# Patient Record
Sex: MALE | Race: WHITE | NOT HISPANIC OR LATINO | Employment: OTHER | URBAN - METROPOLITAN AREA
[De-identification: names, ages, dates, MRNs, and addresses within clinical notes are randomized per-mention and may not be internally consistent; named-entity substitution may affect disease eponyms.]

---

## 2022-02-23 ENCOUNTER — OFFICE VISIT (OUTPATIENT)
Dept: FAMILY MEDICINE | Facility: CLINIC | Age: 67
End: 2022-02-23
Payer: COMMERCIAL

## 2022-02-23 VITALS
BODY MASS INDEX: 28.71 KG/M2 | SYSTOLIC BLOOD PRESSURE: 159 MMHG | HEART RATE: 72 BPM | DIASTOLIC BLOOD PRESSURE: 96 MMHG | WEIGHT: 212 LBS | HEIGHT: 72 IN | TEMPERATURE: 97.8 F | OXYGEN SATURATION: 97 % | RESPIRATION RATE: 16 BRPM

## 2022-02-23 DIAGNOSIS — Z11.59 NEED FOR HEPATITIS C SCREENING TEST: ICD-10-CM

## 2022-02-23 DIAGNOSIS — Z13.220 SCREENING FOR HYPERLIPIDEMIA: ICD-10-CM

## 2022-02-23 DIAGNOSIS — M12.9 ARTHROPATHY: ICD-10-CM

## 2022-02-23 DIAGNOSIS — Z00.00 ENCOUNTER FOR PREVENTIVE HEALTH EXAMINATION: Primary | ICD-10-CM

## 2022-02-23 DIAGNOSIS — N40.1 BENIGN PROSTATIC HYPERPLASIA WITH NOCTURIA: ICD-10-CM

## 2022-02-23 DIAGNOSIS — R35.1 BENIGN PROSTATIC HYPERPLASIA WITH NOCTURIA: ICD-10-CM

## 2022-02-23 DIAGNOSIS — Z00.00 ENCOUNTER FOR MEDICARE ANNUAL WELLNESS EXAM: ICD-10-CM

## 2022-02-23 DIAGNOSIS — Z12.5 ENCOUNTER FOR SCREENING FOR MALIGNANT NEOPLASM OF PROSTATE: ICD-10-CM

## 2022-02-23 LAB
ALBUMIN SERPL-MCNC: 3.9 G/DL (ref 3.4–5)
ALP SERPL-CCNC: 55 U/L (ref 40–150)
ALT SERPL W P-5'-P-CCNC: 33 U/L (ref 0–70)
ANION GAP SERPL CALCULATED.3IONS-SCNC: 9 MMOL/L (ref 3–14)
AST SERPL W P-5'-P-CCNC: 20 U/L (ref 0–45)
BASOPHILS # BLD AUTO: 0 10E3/UL (ref 0–0.2)
BASOPHILS NFR BLD AUTO: 0 %
BILIRUB SERPL-MCNC: 0.5 MG/DL (ref 0.2–1.3)
BUN SERPL-MCNC: 21 MG/DL (ref 7–30)
CALCIUM SERPL-MCNC: 9.5 MG/DL (ref 8.5–10.1)
CHLORIDE BLD-SCNC: 106 MMOL/L (ref 94–109)
CHOLEST SERPL-MCNC: 193 MG/DL
CO2 SERPL-SCNC: 23 MMOL/L (ref 20–32)
CREAT SERPL-MCNC: 1.08 MG/DL (ref 0.66–1.25)
EOSINOPHIL # BLD AUTO: 0.1 10E3/UL (ref 0–0.7)
EOSINOPHIL NFR BLD AUTO: 2 %
ERYTHROCYTE [DISTWIDTH] IN BLOOD BY AUTOMATED COUNT: 13.9 % (ref 10–15)
FASTING STATUS PATIENT QL REPORTED: YES
FOLATE SERPL-MCNC: 20 NG/ML
GFR SERPL CREATININE-BSD FRML MDRD: 76 ML/MIN/1.73M2
GLUCOSE BLD-MCNC: 103 MG/DL (ref 70–99)
HCT VFR BLD AUTO: 48 % (ref 40–53)
HCV AB SERPL QL IA: NONREACTIVE
HDLC SERPL-MCNC: 52 MG/DL
HGB BLD-MCNC: 15.9 G/DL (ref 13.3–17.7)
LDLC SERPL CALC-MCNC: 124 MG/DL
LYMPHOCYTES # BLD AUTO: 1.4 10E3/UL (ref 0.8–5.3)
LYMPHOCYTES NFR BLD AUTO: 18 %
MCH RBC QN AUTO: 29.6 PG (ref 26.5–33)
MCHC RBC AUTO-ENTMCNC: 33.1 G/DL (ref 31.5–36.5)
MCV RBC AUTO: 89 FL (ref 78–100)
MONOCYTES # BLD AUTO: 0.5 10E3/UL (ref 0–1.3)
MONOCYTES NFR BLD AUTO: 7 %
NEUTROPHILS # BLD AUTO: 5.7 10E3/UL (ref 1.6–8.3)
NEUTROPHILS NFR BLD AUTO: 73 %
NONHDLC SERPL-MCNC: 141 MG/DL
PLATELET # BLD AUTO: 290 10E3/UL (ref 150–450)
POTASSIUM BLD-SCNC: 4.4 MMOL/L (ref 3.4–5.3)
PROT SERPL-MCNC: 7.7 G/DL (ref 6.8–8.8)
PSA SERPL-MCNC: 3.4 UG/L (ref 0–4)
RBC # BLD AUTO: 5.37 10E6/UL (ref 4.4–5.9)
SODIUM SERPL-SCNC: 138 MMOL/L (ref 133–144)
TRIGL SERPL-MCNC: 84 MG/DL
TSH SERPL DL<=0.005 MIU/L-ACNC: 2.2 MU/L (ref 0.4–4)
URATE SERPL-MCNC: 6.5 MG/DL (ref 3.5–7.2)
VIT B12 SERPL-MCNC: 499 PG/ML (ref 193–986)
WBC # BLD AUTO: 7.8 10E3/UL (ref 4–11)

## 2022-02-23 PROCEDURE — 86803 HEPATITIS C AB TEST: CPT | Performed by: INTERNAL MEDICINE

## 2022-02-23 PROCEDURE — 82607 VITAMIN B-12: CPT | Performed by: INTERNAL MEDICINE

## 2022-02-23 PROCEDURE — 84550 ASSAY OF BLOOD/URIC ACID: CPT | Performed by: INTERNAL MEDICINE

## 2022-02-23 PROCEDURE — 80053 COMPREHEN METABOLIC PANEL: CPT | Performed by: INTERNAL MEDICINE

## 2022-02-23 PROCEDURE — G0438 PPPS, INITIAL VISIT: HCPCS | Performed by: INTERNAL MEDICINE

## 2022-02-23 PROCEDURE — G0103 PSA SCREENING: HCPCS | Performed by: INTERNAL MEDICINE

## 2022-02-23 PROCEDURE — 36415 COLL VENOUS BLD VENIPUNCTURE: CPT | Performed by: INTERNAL MEDICINE

## 2022-02-23 PROCEDURE — 80061 LIPID PANEL: CPT | Performed by: INTERNAL MEDICINE

## 2022-02-23 PROCEDURE — 82746 ASSAY OF FOLIC ACID SERUM: CPT | Performed by: INTERNAL MEDICINE

## 2022-02-23 RX ORDER — TAMSULOSIN HYDROCHLORIDE 0.4 MG/1
0.4 CAPSULE ORAL DAILY
Qty: 30 CAPSULE | Refills: 1 | Status: SHIPPED | OUTPATIENT
Start: 2022-02-23 | End: 2022-05-31

## 2022-02-23 ASSESSMENT — ENCOUNTER SYMPTOMS: ABDOMINAL PAIN: 1

## 2022-02-23 ASSESSMENT — PAIN SCALES - GENERAL: PAINLEVEL: NO PAIN (0)

## 2022-02-23 ASSESSMENT — ACTIVITIES OF DAILY LIVING (ADL): CURRENT_FUNCTION: NO ASSISTANCE NEEDED

## 2022-02-23 NOTE — NURSING NOTE
"Rickey Kelley is a 66 year old patient who comes in today for a clinic visit  Initial BP:  BP (!) 159/96 (BP Location: Right arm, Cuff Size: Adult Large)   Pulse 72   Temp 97.8  F (36.6  C) (Tympanic)   Resp 16   Ht 1.816 m (5' 11.5\")   Wt 96.2 kg (212 lb)   SpO2 97%   BMI 29.16 kg/m         Disposition: provider notified elevated BP while patient in the clinic    Mony Pearl MA      "

## 2022-02-23 NOTE — LETTER
February 26, 2022      Rickey Kelley  [address] [facility] [address]        Dear ,    I hope the Flomax is helping with the prostate.    Your labs revealed only a slight increase in cholesterol levels.  You also have a uric acid level is a bit high.  Please try to avoid foods with high uric acid content.  There are very good resources on the Internet regarding this.  Additionally, a bit more water on a regular basis will help with clearance of uric acid.    Resulted Orders   Hepatitis C Screen Reflex to HCV RNA Quant and Genotype   Result Value Ref Range    Hepatitis C Antibody Nonreactive Nonreactive    Narrative    Assay performance characteristics have not been established for newborns, infants, and children.   Lipid panel reflex to direct LDL Fasting   Result Value Ref Range    Cholesterol 193 <200 mg/dL    Triglycerides 84 <150 mg/dL    Direct Measure HDL 52 >=40 mg/dL    LDL Cholesterol Calculated 124 (H) <=100 mg/dL    Non HDL Cholesterol 141 (H) <130 mg/dL    Patient Fasting > 8hrs? Yes     Narrative    Cholesterol  Desirable:  <200 mg/dL    Triglycerides  Normal:  Less than 150 mg/dL  Borderline High:  150-199 mg/dL  High:  200-499 mg/dL  Very High:  Greater than or equal to 500 mg/dL    Direct Measure HDL  Female:  Greater than or equal to 50 mg/dL   Male:  Greater than or equal to 40 mg/dL    LDL Cholesterol  Desirable:  <100mg/dL  Above Desirable:  100-129 mg/dL   Borderline High:  130-159 mg/dL   High:  160-189 mg/dL   Very High:  >= 190 mg/dL    Non HDL Cholesterol  Desirable:  130 mg/dL  Above Desirable:  130-159 mg/dL  Borderline High:  160-189 mg/dL  High:  190-219 mg/dL  Very High:  Greater than or equal to 220 mg/dL   Comprehensive metabolic panel (BMP + Alb, Alk Phos, ALT, AST, Total. Bili, TP)   Result Value Ref Range    Sodium 138 133 - 144 mmol/L    Potassium 4.4 3.4 - 5.3 mmol/L    Chloride 106 94 - 109 mmol/L    Carbon Dioxide (CO2) 23 20 - 32 mmol/L    Anion Gap 9 3 - 14  mmol/L    Urea Nitrogen 21 7 - 30 mg/dL    Creatinine 1.08 0.66 - 1.25 mg/dL    Calcium 9.5 8.5 - 10.1 mg/dL    Glucose 103 (H) 70 - 99 mg/dL    Alkaline Phosphatase 55 40 - 150 U/L    AST 20 0 - 45 U/L    ALT 33 0 - 70 U/L    Protein Total 7.7 6.8 - 8.8 g/dL    Albumin 3.9 3.4 - 5.0 g/dL    Bilirubin Total 0.5 0.2 - 1.3 mg/dL    GFR Estimate 76 >60 mL/min/1.73m2      Comment:      Effective December 21, 2021 eGFRcr in adults is calculated using the 2021 CKD-EPI creatinine equation which includes age and gender (Wilfredo et al., [facility], DOI: 10.1056/MWJRoi4229421)   TSH with free T4 reflex   Result Value Ref Range    TSH 2.20 0.40 - 4.00 mU/L   PSA, screen   Result Value Ref Range    Prostate Specific Antigen Screen 3.40 0.00 - 4.00 ug/L   Uric acid   Result Value Ref Range    Uric Acid 6.5 3.5 - 7.2 mg/dL   Vitamin B12   Result Value Ref Range    Vitamin B12 499 193 - 986 pg/mL   Folate   Result Value Ref Range    Folic Acid 20.0 >=5.4 ng/mL      Comment:      Deficient: <3.4 ng/mL  Indeterminate: 3.4-5.4 ng/mL  Normal: > 5.4 ng/mL   CBC with platelets and differential   Result Value Ref Range    WBC Count 7.8 4.0 - 11.0 10e3/uL    RBC Count 5.37 4.40 - 5.90 10e6/uL    Hemoglobin 15.9 13.3 - 17.7 g/dL    Hematocrit 48.0 40.0 - 53.0 %    MCV 89 78 - 100 fL    MCH 29.6 26.5 - 33.0 pg    MCHC 33.1 31.5 - 36.5 g/dL    RDW 13.9 10.0 - 15.0 %    Platelet Count 290 150 - 450 10e3/uL    % Neutrophils 73 %    % Lymphocytes 18 %    % Monocytes 7 %    % Eosinophils 2 %    % Basophils 0 %    Absolute Neutrophils 5.7 1.6 - 8.3 10e3/uL    Absolute Lymphocytes 1.4 0.8 - 5.3 10e3/uL    Absolute Monocytes 0.5 0.0 - 1.3 10e3/uL    Absolute Eosinophils 0.1 0.0 - 0.7 10e3/uL    Absolute Basophils 0.0 0.0 - 0.2 10e3/uL       If you have any questions or concerns, please call the clinic at the number listed above.       Sincerely,      Jae Degroot MD

## 2022-02-23 NOTE — PATIENT INSTRUCTIONS
Rickey;  It was nice to meet you today.    I am a bit concerned about the elevation in your blood pressure today.  This could be just due to the situation.  I do recommend that you start monitoring your blood pressure in the morning as you previously were.    For your enlarged prostate, I recommend a trial of Flomax.  This medication usually starts working within a few days.  I would like to see how you are doing in 4 weeks.  When you return to clinic try to have a few blood pressure values for me to look at as well.    Regarding your left foot it looks like you may have had some neuropathy.  The cause of this neuropathy is uncertain.  We will check a couple lab test today to look for gout or B12 deficiency.    The usual labs will otherwise be evaluated today including your cholesterol and your PSA level.    Best regards  Jae  Patient Education   Personalized Prevention Plan  You are due for the preventive services outlined below.  Your care team is available to assist you in scheduling these services.  If you have already completed any of these items, please share that information with your care team to update in your medical record.  Health Maintenance Due   Topic Date Due     Hepatitis C Screening  Never done     Cholesterol Lab  Never done     Zoster (Shingles) Vaccine (1 of 2) Never done     FALL RISK ASSESSMENT  Never done     Pneumococcal Vaccine (1 of 1 - PPSV23) Never done     AORTIC ANEURYSM SCREENING (SYSTEM ASSIGNED)  Never done     Diptheria Tetanus Pertussis (DTAP/TDAP/TD) Vaccine (2 - Td or Tdap) 12/22/2020     Flu Vaccine (1) Never done       Understanding USDA MyPlate  The USDA has guidelines to help you make healthy food choices. These are called MyPlate. MyPlate shows the food groups that make up healthy meals using the image of a place setting. Before you eat, think about the healthiest choices for what to put on your plate or in your cup or bowl. To learn more about building a healthy plate,  visit www.choosemyplate.gov.    The food groups    Fruits. Any fruit or 100% fruit juice counts as part of the Fruit Group. Fruits may be fresh, canned, frozen, or dried, and may be whole, cut-up, or pureed. Make 1/2 of your plate fruits and vegetables.    Vegetables. Any vegetable or 100% vegetable juice counts as a member of the Vegetable Group. Vegetables may be fresh, frozen, canned, or dried. They can be served raw or cooked and may be whole, cut-up, or mashed. Make 1/2 of your plate fruits and vegetables.    Grains. All foods made from grains are part of the Grains Group. These include wheat, rice, oats, cornmeal, and barley. Grains are often used to make foods such as bread, pasta, oatmeal, cereal, tortillas, and grits. Grains should be no more than 1/4 of your plate. At least half of your grains should be whole grains.    Protein. This group includes meat, poultry, seafood, beans and peas, eggs, processed soy products (such as tofu), nuts (including nut butters), and seeds. Make protein choices no more than 1/4 of your plate. Meat and poultry choices should be lean or low fat.    Dairy. The Dairy Group includes all fluid milk products and foods made from milk that contain calcium, such as yogurt and cheese. (Foods that have little calcium, such as cream, butter, and cream cheese, are not part of this group.) Most dairy choices should be low-fat or fat-free.    Oils. Oils aren't a food group, but they do contain essential nutrients. However it's important to watch your intake of oils. These are fats that are liquid at room temperature. They include canola, corn, olive, soybean, vegetable, and sunflower oil. Foods that are mainly oil include mayonnaise, certain salad dressings, and soft margarines. You likely already get your daily oil allowance from the foods you eat.  Things to limit  Eating healthy also means limiting these things in your diet:       Salt (sodium). Many processed foods have a lot of sodium.  To keep sodium intake down, eat fresh vegetables, meats, poultry, and seafood when possible. Purchase low-sodium, reduced-sodium, or no-salt-added food products at the store. And don't add salt to your meals at home. Instead, season them with herbs and spices such as dill, oregano, cumin, and paprika. Or try adding flavor with lemon or lime zest and juice.    Saturated fat. Saturated fats are most often found in animal products such as beef, pork, and chicken. They are often solid at room temperature, such as butter. To reduce your saturated fat intake, choose leaner cuts of meat and poultry. And try healthier cooking methods such as grilling, broiling, roasting, or baking. For a simple lower-fat swap, use plain nonfat yogurt instead of mayonnaise when making potato salad or macaroni salad.    Added sugars. These are sugars added to foods. They are in foods such as ice cream, candy, soda, fruit drinks, sports drinks, energy drinks, cookies, pastries, jams, and syrups. Cut down on added sugars by sharing sweet treats with a family member or friend. You can also choose fruit for dessert, and drink water or other unsweetened beverages.     [employer] last reviewed this educational content on 6/1/2020 2000-2021 [employer]. All rights reserved. This information is not intended as a substitute for professional medical care. Always follow your healthcare professional's instructions.

## 2022-03-31 ENCOUNTER — OFFICE VISIT (OUTPATIENT)
Dept: FAMILY MEDICINE | Facility: CLINIC | Age: 67
End: 2022-03-31
Payer: COMMERCIAL

## 2022-03-31 VITALS
RESPIRATION RATE: 18 BRPM | HEART RATE: 66 BPM | DIASTOLIC BLOOD PRESSURE: 91 MMHG | WEIGHT: 207 LBS | OXYGEN SATURATION: 96 % | SYSTOLIC BLOOD PRESSURE: 147 MMHG | TEMPERATURE: 98 F | BODY MASS INDEX: 28.04 KG/M2 | HEIGHT: 72 IN

## 2022-03-31 DIAGNOSIS — N40.1 BENIGN PROSTATIC HYPERPLASIA WITH NOCTURIA: Primary | ICD-10-CM

## 2022-03-31 DIAGNOSIS — R35.1 BENIGN PROSTATIC HYPERPLASIA WITH NOCTURIA: Primary | ICD-10-CM

## 2022-03-31 PROCEDURE — 99214 OFFICE O/P EST MOD 30 MIN: CPT | Performed by: INTERNAL MEDICINE

## 2022-03-31 RX ORDER — TADALAFIL 5 MG/1
5 TABLET ORAL DAILY
Qty: 30 TABLET | Refills: 0 | Status: SHIPPED | OUTPATIENT
Start: 2022-03-31 | End: 2022-05-31

## 2022-03-31 ASSESSMENT — PAIN SCALES - GENERAL: PAINLEVEL: NO PAIN (0)

## 2022-03-31 NOTE — PATIENT INSTRUCTIONS
Rickey;  We can try a low-dose Cialis daily for your benign prostatic hypertrophy.  Discontinue your Flomax in this instance, and evaluate your blood pressure carefully.    I have ordered this for benign prostatic enlargement and not for erectile dysfunction, so hopefully your insurance will not be an issue.    Keep me up-to-date in regards to how you are doing.    Please add a multivitamin to your daily dietary regime.    Best regards  Jae

## 2022-05-31 DIAGNOSIS — R35.1 BENIGN PROSTATIC HYPERPLASIA WITH NOCTURIA: ICD-10-CM

## 2022-05-31 DIAGNOSIS — N40.1 BENIGN PROSTATIC HYPERPLASIA WITH NOCTURIA: ICD-10-CM

## 2022-05-31 RX ORDER — TAMSULOSIN HYDROCHLORIDE 0.4 MG/1
0.4 CAPSULE ORAL DAILY
Qty: 90 CAPSULE | Refills: 3 | Status: SHIPPED | OUTPATIENT
Start: 2022-05-31

## 2022-05-31 RX ORDER — TADALAFIL 5 MG/1
5 TABLET ORAL DAILY
Qty: 90 TABLET | Refills: 3 | Status: SHIPPED | OUTPATIENT
Start: 2022-05-31

## 2022-05-31 NOTE — TELEPHONE ENCOUNTER
Left a detailed message with PCP response below    Gemma REAL, Triage RN  [facility]

## 2022-05-31 NOTE — TELEPHONE ENCOUNTER
"Last OV 3/31/22     Pt called to check on this, OUT of meds     Trying to determine which one works better - was taking flomax, then stopped and switched to cialis     Seems to work best if alternating - every 2 weeks     Asking if he can continue to alternate these?     Routing refill request to provider for review/approval because:    Requested Prescriptions   Pending Prescriptions Disp Refills     tadalafil (CIALIS) 5 MG tablet [Pharmacy Med Name: Tadalafil Oral Tablet 5 MG] 30 tablet 0     Sig: Take 1 tablet (5 mg) by mouth daily       Erectile Dysfuction Protocol Failed - 5/31/2022  2:41 PM        Failed - Absence of Alpha Blockers on Med list        Passed - Absence of nitrates on medication list        Passed - Recent (12 mo) or future (30 days) visit within the authorizing provider's specialty     Patient has had an office visit with the authorizing provider or a provider within the authorizing providers department within the previous 12 mos or has a future within next 30 days. See \"Patient Info\" tab in inbasket, or \"Choose Columns\" in Meds & Orders section of the refill encounter.              Passed - Medication is active on med list        Passed - Patient is age 18 or older           tamsulosin (FLOMAX) 0.4 MG capsule [Pharmacy Med Name: Tamsulosin HCl Oral Capsule 0.4 MG] 30 capsule 0     Sig: Take 1 capsule (0.4 mg) by mouth daily       Alpha Blockers Failed - 5/31/2022  2:41 PM        Failed - Blood pressure under 140/90 in past 12 months     BP Readings from Last 3 Encounters:   03/31/22 (!) 147/91   02/23/22 (!) 159/96                 Failed - Patient does not have Tadalafil, Vardenafil, or Sildenafil on their medication list        Passed - Recent (12 mo) or future (30 days) visit within the authorizing provider's specialty     Patient has had an office visit with the authorizing provider or a provider within the authorizing providers department within the previous 12 mos or has a future within " "next 30 days. See \"Patient Info\" tab in inbasket, or \"Choose Columns\" in Meds & Orders section of the refill encounter.              Passed - Medication is active on med list        Passed - Patient is 18 years of age or older             "

## 2023-02-08 NOTE — PROGRESS NOTES
"  Assessment & Plan     Benign prostatic hyperplasia with nocturia  Continued symptoms of nocturia.  The patient's urinary symptoms are only minimally improved with the utilization of tamsulosin.  Of note his blood pressure is well controlled with most systolic pressures of 110.    We will attempt to utilize Cialis for his BPH symptomatology.  Patient states he has decreased sexual urge/dry with the utilization of tamsulosin.    .  A trial of Cialis daily will be recommended.     - tadalafil (CIALIS) 5 MG tablet; Take 1 tablet (5 mg) by mouth daily             See Patient Instructions    Return if symptoms worsen or fail to improve, for pcp.    Jae Degroot MD  [facility] FILIBERTO Lang is a 66 year old who presents for the following health issues     HPI 66-year-old male with a history of elevated PSA    Patient presents primarily for follow-up on benign prostatic hypertrophy symptomatology together with high blood pressure.  His blood pressure is better controlled.  His home readings have been superb.  Systolic pressures under 125 consistently.  Diastolic pressures typically in the 70s.    Tamsulosin has been only minimally effective in reducing nocturia.  Unfortunately tamsulosin has diminished his sexual drive and performance.  For this reason we discussed alternatives.      Review of Systems   Constitutional, HEENT, cardiovascular, pulmonary, gi and gu systems are negative, except as otherwise noted.      Objective    BP (!) 147/91 (BP Location: Left arm, Patient Position: Chair, Cuff Size: Adult Large)   Pulse 66   Temp 98  F (36.7  C) (Temporal)   Resp 18   Ht 1.816 m (5' 11.5\")   Wt 93.9 kg (207 lb)   SpO2 96%   BMI 28.47 kg/m    Body mass index is 28.47 kg/m .  Physical Exam   GENERAL: healthy, alert and no distress  EYES: Eyes grossly normal to inspection, PERRL and conjunctivae and sclerae normal  HENT: ear canals and TM's normal, nose and mouth without ulcers or " lesions  NECK: no adenopathy, no asymmetry, masses, or scars and thyroid normal to palpation  RESP: lungs clear to auscultation - no rales, rhonchi or wheezes  CV: regular rate and rhythm, normal S1 S2, no S3 or S4, no murmur, click or rub, no peripheral edema and peripheral pulses strong  ABDOMEN: soft, nontender, no hepatosplenomegaly, no masses and bowel sounds normal  MS: no gross musculoskeletal defects noted, no edema  SKIN: no suspicious lesions or rashes  NEURO: Normal strength and tone, mentation intact and speech normal  PSYCH: mentation appears normal, affect normal/bright    Office Visit on 02/23/2022   Component Date Value Ref Range Status     Hepatitis C Antibody 02/23/2022 Nonreactive  Nonreactive Final     Cholesterol 02/23/2022 193  <200 mg/dL Final     Triglycerides 02/23/2022 84  <150 mg/dL Final     Direct Measure HDL 02/23/2022 52  >=40 mg/dL Final     LDL Cholesterol Calculated 02/23/2022 124 (A) <=100 mg/dL Final     Non HDL Cholesterol 02/23/2022 141 (A) <130 mg/dL Final     Patient Fasting > 8hrs? 02/23/2022 Yes   Final     Sodium 02/23/2022 138  133 - 144 mmol/L Final     Potassium 02/23/2022 4.4  3.4 - 5.3 mmol/L Final     Chloride 02/23/2022 106  94 - 109 mmol/L Final     Carbon Dioxide (CO2) 02/23/2022 23  20 - 32 mmol/L Final     Anion Gap 02/23/2022 9  3 - 14 mmol/L Final     Urea Nitrogen 02/23/2022 21  7 - 30 mg/dL Final     Creatinine 02/23/2022 1.08  0.66 - 1.25 mg/dL Final     Calcium 02/23/2022 9.5  8.5 - 10.1 mg/dL Final     Glucose 02/23/2022 103 (A) 70 - 99 mg/dL Final     Alkaline Phosphatase 02/23/2022 55  40 - 150 U/L Final     AST 02/23/2022 20  0 - 45 U/L Final     ALT 02/23/2022 33  0 - 70 U/L Final     Protein Total 02/23/2022 7.7  6.8 - 8.8 g/dL Final     Albumin 02/23/2022 3.9  3.4 - 5.0 g/dL Final     Bilirubin Total 02/23/2022 0.5  0.2 - 1.3 mg/dL Final     GFR Estimate 02/23/2022 76  >60 mL/min/1.73m2 Final    Effective December 21, 2021 eGFRcr in adults is  calculated using the 2021 CKD-EPI creatinine equation which includes age and gender (Wilfredo et al., [facility], DOI: 10.1056/[id number])     TSH 02/23/2022 2.20  0.40 - 4.00 mU/L Final     Prostate Specific Antigen Screen 02/23/2022 3.40  0.00 - 4.00 ug/L Final     Uric Acid 02/23/2022 6.5  3.5 - 7.2 mg/dL Final     Vitamin B12 02/23/2022 499  193 - 986 pg/mL Final     Folic Acid 02/23/2022 20.0  >=5.4 ng/mL Final    Deficient: <3.4 ng/mL  Indeterminate: 3.4-5.4 ng/mL  Normal: > 5.4 ng/mL     WBC Count 02/23/2022 7.8  4.0 - 11.0 10e3/uL Final     RBC Count 02/23/2022 5.37  4.40 - 5.90 10e6/uL Final     Hemoglobin 02/23/2022 15.9  13.3 - 17.7 g/dL Final     Hematocrit 02/23/2022 48.0  40.0 - 53.0 % Final     MCV 02/23/2022 89  78 - 100 fL Final     MCH 02/23/2022 29.6  26.5 - 33.0 pg Final     MCHC 02/23/2022 33.1  31.5 - 36.5 g/dL Final     RDW 02/23/2022 13.9  10.0 - 15.0 % Final     Platelet Count 02/23/2022 290  150 - 450 10e3/uL Final     % Neutrophils 02/23/2022 73  % Final     % Lymphocytes 02/23/2022 18  % Final     % Monocytes 02/23/2022 7  % Final     % Eosinophils 02/23/2022 2  % Final     % Basophils 02/23/2022 0  % Final     Absolute Neutrophils 02/23/2022 5.7  1.6 - 8.3 10e3/uL Final     Absolute Lymphocytes 02/23/2022 1.4  0.8 - 5.3 10e3/uL Final     Absolute Monocytes 02/23/2022 0.5  0.0 - 1.3 10e3/uL Final     Absolute Eosinophils 02/23/2022 0.1  0.0 - 0.7 10e3/uL Final     Absolute Basophils 02/23/2022 0.0  0.0 - 0.2 10e3/uL Final                Topical Clindamycin Counseling: Patient counseled that this medication may cause skin irritation or allergic reactions.  In the event of skin irritation, the patient was advised to reduce the amount of the drug applied or use it less frequently.   The patient verbalized understanding of the proper use and possible adverse effects of clindamycin.  All of the patient's questions and concerns were addressed.

## 2024-08-13 NOTE — PROGRESS NOTES
"SUBJECTIVE:   Rickey Kelley is a 66 year old male who presents for Preventive Visit.  Overall doing well.    The patient has retired.  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    Occasionally has had some burning discomfort in the metatarsal phalangeal joints of the left lower extremity.  He is uncertain whether or not he is ever had frostbite to the extremity.  He states that the skin occasionally gets red and very tender to palpation.  States the anti-inflammatories have helped him historically in this regard.    Patient also has issues with benign prostatic hypertrophy.  He states he is waking up 3-4 times per night.  Sometimes having difficulty getting back to sleep.  Urinary flow can be poor.    Otherwise no other significant symptoms no chest pain or shortness of breath.      Patient has been advised of split billing requirements and indicates understanding: Yes  Are you in the first 12 months of your Medicare coverage?  Yes,  Visual Acuity:  Right Eye: 10/12.5   Left Eye: 10/12.5  Both Eyes: 10/10    Healthy Habits:     In general, how would you rate your overall health?  Good    Frequency of exercise:  2-3 days/week    Duration of exercise:  Less than 15 minutes    Do you usually eat at least 4 servings of fruit and vegetables a day, include whole grains    & fiber and avoid regularly eating high fat or \"junk\" foods?  No    Taking medications regularly:  Yes    Medication side effects:  Not applicable    Ability to successfully perform activities of daily living:  No assistance needed    Home Safety:  No safety concerns identified    Hearing Impairment:  No hearing concerns    In the past 6 months, have you been bothered by leaking of urine?  No    In general, how would you rate your overall mental or emotional health?  Excellent      PHQ-2 Total Score: 0    Additional concerns today:  Yes    Do you feel safe in your environment? Yes    Have you ever done Advance Care Planning? (For example, " a Health Directive, POLST, or a discussion with a medical provider or your loved ones about your wishes): No, advance care planning information given to patient to review.  Patient plans to discuss their wishes with loved ones or provider.         Fall risk  Fallen 2 or more times in the past year?: No  Any fall with injury in the past year?: No    Cognitive Screening   1) Repeat 3 items (Leader, Season, Table)    2) Clock draw: NORMAL  3) 3 item recall: Recalls 2 objects   Results: NORMAL clock, 1-2 items recalled: COGNITIVE IMPAIRMENT LESS LIKELY    Mini-CogTM Copyright S Trina. Licensed by the author for use in [facility]; reprinted with permission ([email or web address]@[email or web address]). All rights reserved.      Do you have sleep apnea, excessive snoring or daytime drowsiness?: no    Reviewed and updated as needed this visit by clinical staff   Tobacco  Allergies  Meds              Reviewed and updated as needed this visit by Provider                 Social History     Tobacco Use     Smoking status: Never Smoker     Smokeless tobacco: Never Used   Substance Use Topics     Alcohol use: Not Currently     If you drink alcohol do you typically have >3 drinks per day or >7 drinks per week? No    Alcohol Use 2/23/2022   Prescreen: >3 drinks/day or >7 drinks/week? No   Prescreen: >3 drinks/day or >7 drinks/week? -               Current providers sharing in care for this patient include:   Patient Care Team:  Jae Degroot MD as PCP - General (Internal Medicine)  Jae Degroot MD as Assigned PCP    The following health maintenance items are reviewed in Epic and correct as of today:  Health Maintenance Due   Topic Date Due     ZOSTER IMMUNIZATION (1 of 2) Never done     Pneumococcal Vaccine: 65+ Years (1 of 1 - PPSV23) Never done     AORTIC ANEURYSM SCREENING (SYSTEM ASSIGNED)  Never done     DTAP/TDAP/TD IMMUNIZATION (2 - Td or Tdap) 12/22/2020     INFLUENZA VACCINE (1) Never done     Lab work is in process  Do tetanus  "vaccination.      Review of Systems  Constitutional, HEENT, cardiovascular, pulmonary, gi and gu systems are negative, except as otherwise noted.    OBJECTIVE:   BP (!) 159/96 (BP Location: Right arm, Cuff Size: Adult Large)   Pulse 72   Temp 97.8  F (36.6  C) (Tympanic)   Resp 16   Ht 1.816 m (5' 11.5\")   Wt 96.2 kg (212 lb)   SpO2 97%   BMI 29.16 kg/m   Estimated body mass index is 29.16 kg/m  as calculated from the following:    Height as of this encounter: 1.816 m (5' 11.5\").    Weight as of this encounter: 96.2 kg (212 lb).  Physical Exam  GENERAL: healthy, alert and no distress  EYES: Eyes grossly normal to inspection, PERRL and conjunctivae and sclerae normal  HENT: ear canals and TM's normal, nose and mouth without ulcers or lesions  NECK: no adenopathy, no asymmetry, masses, or scars and thyroid normal to palpation  RESP: lungs clear to auscultation - no rales, rhonchi or wheezes  CV: regular rate and rhythm, normal S1 S2, no S3 or S4, no murmur, click or rub, no peripheral edema and peripheral pulses strong  ABDOMEN: soft, nontender, no hepatosplenomegaly, no masses and bowel sounds normal  MS: no gross musculoskeletal defects noted, no edema  SKIN: no suspicious lesions or rashes  NEURO: Normal strength and tone, mentation intact and speech normal  PSYCH: mentation appears normal, affect normal/bright    Diagnostic Test Results:  Labs reviewed in Epic    ASSESSMENT / PLAN:       ICD-10-CM    1. Encounter for preventive health examination  Z00.00 Comprehensive metabolic panel (BMP + Alb, Alk Phos, ALT, AST, Total. Bili, TP)     CBC with platelets and differential     TSH with free T4 reflex     PSA, screen     Comprehensive metabolic panel (BMP + Alb, Alk Phos, ALT, AST, Total. Bili, TP)     CBC with platelets and differential     TSH with free T4 reflex     PSA, screen   2. Need for hepatitis C screening test  Z11.59 Hepatitis C Screen Reflex to HCV RNA Quant and Genotype     Hepatitis C Screen " "Reflex to HCV RNA Quant and Genotype   3. Screening for hyperlipidemia  Z13.220 Lipid panel reflex to direct LDL Fasting     Lipid panel reflex to direct LDL Fasting   4. Benign prostatic hyperplasia with nocturia  N40.1 tamsulosin (FLOMAX) 0.4 MG capsule    R35.1    5. Arthropathy  M12.9 Uric acid     Vitamin B12     Folate     Uric acid     Vitamin B12     Folate   6. Encounter for screening for malignant neoplasm of prostate   Z12.5 PSA, screen     PSA, screen   7. Encounter for Medicare annual wellness exam  Z00.00        For the benign prostatic hypertrophy and nocturia I would recommend initiating Flomax.  I will see him back in 1 month to check his symptoms as well as his blood pressure.  He will start to monitor his blood pressure again at home.  If he continues to have urinary symptoms I would like to add finasteride at that time.    The left lower extremity arthropathy is somewhat consistent with gouty or pseudogout arthropathy.  We will evaluate uric acid level and ESR today.  Additionally I would like to evaluate B12 and folic acid levels.    COUNSELING:  Reviewed preventive health counseling, as reflected in patient instructions    Estimated body mass index is 29.16 kg/m  as calculated from the following:    Height as of this encounter: 1.816 m (5' 11.5\").    Weight as of this encounter: 96.2 kg (212 lb).        He reports that he has never smoked. He has never used smokeless tobacco.      Appropriate preventive services were discussed with this patient, including applicable screening as appropriate for cardiovascular disease, diabetes, osteopenia/osteoporosis, and glaucoma.  As appropriate for age/gender, discussed screening for colorectal cancer, prostate cancer, breast cancer, and cervical cancer. Checklist reviewing preventive services available has been given to the patient.    Reviewed patients plan of care and provided an AVS. The Basic Care Plan (routine screening as documented in Health " spontaneous speech. Speech therapy services recommended to address aforementioned deficits, in efforts to assist patient improve communication and independence in the home and community.   Patient noted with rough, strained vocal quality, frequent throat clearing, report of persistent runny nose and postnasal drainage. Patient reports she has had hoarseness, but is unsure of how long. Recommend further evaluation by her ENT.     Problem List: (Impacting functional limitations):    CVA, Expressive Language Impairment, and apraxia  Therapy Prognosis: Good  RECOMMENDATIONS   Recommendations:    Yes. Speech therapy services are clinically indicated at this time to address apraxia and expressive language impairments.     PLAN    Interventions Planned (Treatment may consist of any combination of the following):    Apraxia based treatment, Expressive language, Training in compensatory strategies, and Education  Goals: (Goals have been discussed and agreed upon with patient.)  Short-Term Functional Goals: Time Frame: 12 weeks   Patient will fill in carrier phrase/complete automatic speech tasks using appropriate breath support with >80% accuracy given mod cues. (Goal progressing)  Patient will produce 3-5 syllable words with articulatory precision with 80% accuracy given min cues. (Goal progressing)   Patient will implement trained strategies (slow rate, over-articulation, pacing) with correct articulation with functional phrases with >80% accuracy given min cues. (Goal met)  Patient will learn and implement strategies (slow rate, over-articulation, pacing) to communicate information at the sentence level with 80% accuracy given min cues. (Goal progressing)  Patient will participate in 5 minute conversation with no more than 5 cues for use of strategies (slow rate, over-articulation, pacing) to communicate effectively. (Goal progressing)  Patient will report at least one situation in which trained strategies were utilized  Maintenance) for Rickey meets the Care Plan requirement. This Care Plan has been established and reviewed with the Patient.    Counseling Resources:  ATP IV Guidelines  Pooled Cohorts Equation Calculator  Breast Cancer Risk Calculator  Breast Cancer: Medication to Reduce Risk  FRAX Risk Assessment  ICSI Preventive Guidelines  Dietary Guidelines for Americans, 2010  USDA's MyPlate  ASA Prophylaxis  Lung CA Screening    Jae Degroot MD  [facility]    Identified Health Risks:    The patient was counseled and encouraged to consider modifying their diet and eating habits. He was provided with information on recommended healthy diet options.